# Patient Record
Sex: MALE | HISPANIC OR LATINO | URBAN - METROPOLITAN AREA
[De-identification: names, ages, dates, MRNs, and addresses within clinical notes are randomized per-mention and may not be internally consistent; named-entity substitution may affect disease eponyms.]

---

## 2017-05-10 ENCOUNTER — OFFICE (OUTPATIENT)
Dept: URBAN - METROPOLITAN AREA CLINIC 33 | Facility: CLINIC | Age: 57
End: 2017-05-10

## 2017-05-10 VITALS
DIASTOLIC BLOOD PRESSURE: 83 MMHG | SYSTOLIC BLOOD PRESSURE: 121 MMHG | WEIGHT: 154 LBS | HEART RATE: 98 BPM | TEMPERATURE: 98 F | HEIGHT: 65 IN

## 2017-05-10 DIAGNOSIS — K72.90 HEPATIC FAILURE, UNSPECIFIED WITHOUT COMA: ICD-10-CM

## 2017-05-10 DIAGNOSIS — I85.00 ESOPHAGEAL VARICES WITHOUT BLEEDING: ICD-10-CM

## 2017-05-10 DIAGNOSIS — K70.30 ALCOHOLIC CIRRHOSIS OF LIVER WITHOUT ASCITES: ICD-10-CM

## 2017-05-10 PROCEDURE — 99204 OFFICE O/P NEW MOD 45 MIN: CPT

## 2017-05-10 NOTE — SERVICEHPINOTES
SANDIE HENDERSON   is a   56   year old     male who complains of cirrhosis of the liver. He is here to establish care with GI. His sister and daughter are present for the office visit. He was a heavy drinker. He stopped drinking ETOH 4 months ago. He smoked 5-6 cigars a day, but quit 4 months ago. No family history of liver disease. He was diagnosed with cirrhosis 3 years ago. He had an EGD at [facility] 6/16/2016-moderately severe hypertensive portal gastropathy and varices s/p band ligation. He isn't taking any beta blockers.  He was hospitalized at [facility] 2 weeks ago for cirrhosis of the liver. At that time he had a paraecentesis, labs and abdominal ultrasound.  He goes to [facility] often for cirrhosis treatment. He normally weighs around #140-143, since yesterday he has gained #4. He is jaundice. His urine is yellow. He has a BM 1-4 times depending on whether or not he takes lactulose. He can't afford xifaxin, so isn't taking it. Stools are BSS type 7. At times notes blood in stool. He reports having a colonoscopy and EGD 3 months ago at [facility]. He reports having a capsule endoscopy at [facility] 3 months ago. He doesn't sleep well at night. Normally sleeps about 3-4 hours a night. He forgets things a lot. At times he feels like he is going to pass out when he is walking. He notes swelling/pain in RUQ pain. Reports frequent nauseous, worse with meals. He coughs up phlegm after meals. He thinks he had a fever yesterday, he had the chills. Denies lower extremity edema.

## 2017-06-12 ENCOUNTER — OFFICE (OUTPATIENT)
Dept: URBAN - METROPOLITAN AREA CLINIC 33 | Facility: CLINIC | Age: 57
End: 2017-06-12
Payer: MEDICAID

## 2017-06-12 VITALS
WEIGHT: 156 LBS | TEMPERATURE: 97.7 F | DIASTOLIC BLOOD PRESSURE: 62 MMHG | HEART RATE: 87 BPM | SYSTOLIC BLOOD PRESSURE: 97 MMHG | HEIGHT: 65 IN

## 2017-06-12 DIAGNOSIS — I85.00 ESOPHAGEAL VARICES WITHOUT BLEEDING: ICD-10-CM

## 2017-06-12 DIAGNOSIS — K72.90 HEPATIC FAILURE, UNSPECIFIED WITHOUT COMA: ICD-10-CM

## 2017-06-12 DIAGNOSIS — K70.30 ALCOHOLIC CIRRHOSIS OF LIVER WITHOUT ASCITES: ICD-10-CM

## 2017-06-12 DIAGNOSIS — K31.9 DISEASE OF STOMACH AND DUODENUM, UNSPECIFIED: ICD-10-CM

## 2017-06-12 PROCEDURE — 99214 OFFICE O/P EST MOD 30 MIN: CPT

## 2017-06-12 NOTE — SERVICEHPINOTES
SANDIE HENDERSON   is a   56   year old male who is being seen in consultation at the request of   JAQUELIN TIERNEY   for cirrhosis follow up. His sister is present for the office visit. He stopped drinking ETOH 5-6 months ago.  He was diagnosed with cirrhosis 3 years ago. He had an EGD at [facility] 6/16/2016-moderately severe hypertensive portal gastropathy and varices s/p band ligation. He isn't taking any beta blockers.He is jaundice.  He has a BM 1-4 times depending on whether or not he takes lactulose.  BSS type 7. He recently started taking Xifaxin 550mg BID.  Normally sleeps about 3-4 hours a night. He forgets things a lot. At times he feels like he is going to pass out when he is walking. Denies lower extremity edema.He has been having a paracentesis every week since early May. He had one today.  Diagnostic paracentesis 5/18/2017-no malignancy, SAAG gradient 2.5. He is taking furosemide 40mg daily and spironolactone 100mg daily.His PCP recently started him on Ferralet 90mg daily for anemia. He reports having an EGD, colonoscopy and capsule endoscopy within the last 6 months at [facility]. Labs 5/25/2017 Na-136, K-4.1, creatinine-1.93, total bili-3.9, direct bili-2.2, indirect bili-1.7, AST-103, ALT-52, alb-3.0, hemoglobin A1C-4.7%, hgb-8.5, hct-25.3. Unable to perform platelet count.

## 2017-12-18 ENCOUNTER — INPATIENT HOSPITAL (OUTPATIENT)
Dept: URBAN - METROPOLITAN AREA HOSPITAL 60 | Facility: HOSPITAL | Age: 57
End: 2017-12-18
Payer: MEDICAID

## 2017-12-18 DIAGNOSIS — K70.30 ALCOHOLIC CIRRHOSIS OF LIVER WITHOUT ASCITES: ICD-10-CM

## 2017-12-18 DIAGNOSIS — R41.82 ALTERED MENTAL STATUS, UNSPECIFIED: ICD-10-CM

## 2017-12-18 DIAGNOSIS — K72.90 HEPATIC FAILURE, UNSPECIFIED WITHOUT COMA: ICD-10-CM

## 2017-12-18 PROCEDURE — 99232 SBSQ HOSP IP/OBS MODERATE 35: CPT
